# Patient Record
Sex: MALE | Race: WHITE | NOT HISPANIC OR LATINO | URBAN - METROPOLITAN AREA
[De-identification: names, ages, dates, MRNs, and addresses within clinical notes are randomized per-mention and may not be internally consistent; named-entity substitution may affect disease eponyms.]

---

## 2017-07-17 ENCOUNTER — NEW PATIENT (OUTPATIENT)
Dept: URBAN - METROPOLITAN AREA CLINIC 44 | Facility: CLINIC | Age: 67
End: 2017-07-17
Payer: MEDICARE

## 2017-07-17 PROCEDURE — 92004 COMPRE OPH EXAM NEW PT 1/>: CPT | Performed by: OPTOMETRIST

## 2017-07-17 PROCEDURE — 92250 FUNDUS PHOTOGRAPHY W/I&R: CPT | Performed by: OPTOMETRIST

## 2017-07-17 ASSESSMENT — VISUAL ACUITY
OS: 20/30
OD: 20/30

## 2017-07-17 ASSESSMENT — KERATOMETRY
OD: 45.25
OS: 45.13

## 2017-07-17 ASSESSMENT — INTRAOCULAR PRESSURE
OS: 16
OD: 18

## 2020-03-16 ENCOUNTER — FOLLOW UP ESTABLISHED (OUTPATIENT)
Dept: URBAN - METROPOLITAN AREA CLINIC 44 | Facility: CLINIC | Age: 70
End: 2020-03-16
Payer: COMMERCIAL

## 2020-03-16 DIAGNOSIS — E11.9 TYPE 2 DIABETES MELLITUS WITHOUT COMPLICATIONS: ICD-10-CM

## 2020-03-16 DIAGNOSIS — H43.812 VITREOUS DEGENERATION, LEFT EYE: ICD-10-CM

## 2020-03-16 PROCEDURE — 92014 COMPRE OPH EXAM EST PT 1/>: CPT | Performed by: OPTOMETRIST

## 2020-03-16 ASSESSMENT — KERATOMETRY
OS: 45.00
OD: 45.13

## 2020-03-16 ASSESSMENT — VISUAL ACUITY
OS: 20/25
OD: 20/20

## 2020-03-16 ASSESSMENT — INTRAOCULAR PRESSURE
OS: 18
OD: 18

## 2021-06-03 ENCOUNTER — OFFICE VISIT (OUTPATIENT)
Dept: URBAN - METROPOLITAN AREA CLINIC 44 | Facility: CLINIC | Age: 71
End: 2021-06-03
Payer: COMMERCIAL

## 2021-06-03 DIAGNOSIS — Z79.84 LONG TERM (CURRENT) USE OF ORAL ANTIDIABETIC DRUGS: ICD-10-CM

## 2021-06-03 DIAGNOSIS — H43.393 OTHER VITREOUS OPACITIES, BILATERAL: ICD-10-CM

## 2021-06-03 DIAGNOSIS — H25.13 AGE-RELATED NUCLEAR CATARACT, BILATERAL: ICD-10-CM

## 2021-06-03 DIAGNOSIS — D31.31 BENIGN NEOPLASM OF RIGHT CHOROID: ICD-10-CM

## 2021-06-03 PROCEDURE — 92134 CPTRZ OPH DX IMG PST SGM RTA: CPT | Performed by: OPTOMETRIST

## 2021-06-03 PROCEDURE — 92014 COMPRE OPH EXAM EST PT 1/>: CPT | Performed by: OPTOMETRIST

## 2021-06-03 ASSESSMENT — VISUAL ACUITY
OD: 20/30
OS: 20/40

## 2021-06-03 ASSESSMENT — INTRAOCULAR PRESSURE
OS: 17
OD: 17

## 2021-06-03 ASSESSMENT — KERATOMETRY
OS: 44.88
OD: 45.00

## 2021-06-03 NOTE — IMPRESSION/PLAN
Impression: Benign neoplasm of right choroid: D31.31. Plan: PLAN: Discussed findings. OPTOS today. Observe.  RTC 12 months for DFE/OPTOS

## 2021-06-03 NOTE — IMPRESSION/PLAN
Impression: Other vitreous opacities, bilateral: H43.393. Vision is good but patient is experiencing new symptom's of flashes and/or floaters. No Hx of RD. Fundus exam is unremarkable for tears, holes or RD. Discussed with patient S/S of potential RD/RT and to RTC immediately for evaluation Plan: PLAN: RTC STAT  if experiences S/S of RD/RT.

## 2021-06-03 NOTE — IMPRESSION/PLAN
Impression: Type 2 diabetes mellitus w/o complication: E86.9. Per exam and OCT no vascular abnormalities noted. Patient reports A1C and blood sugars under good control. Plan: PLAN: Stressed importance of regular follow ups with PCP and monitoring of A1C and glucose levels. Discussed with patient to maintain A1C at 7.0 or below will greatly decreased chances of retinopathy. Discussed diet, exercise, nutrition. RTC 12 months for complete.